# Patient Record
Sex: MALE | Race: WHITE | URBAN - METROPOLITAN AREA
[De-identification: names, ages, dates, MRNs, and addresses within clinical notes are randomized per-mention and may not be internally consistent; named-entity substitution may affect disease eponyms.]

---

## 2021-07-29 ENCOUNTER — HISTORICAL (OUTPATIENT)
Dept: ADMINISTRATIVE | Facility: HOSPITAL | Age: 56
End: 2021-07-29

## 2021-07-29 LAB
ALBUMIN SERPL-MCNC: 4 GM/DL (ref 3.5–5)
ALBUMIN/GLOB SERPL: 1.2 RATIO (ref 1.1–2)
ALP SERPL-CCNC: 55 UNIT/L (ref 40–150)
ALT SERPL-CCNC: 12 UNIT/L (ref 0–55)
AST SERPL-CCNC: 13 UNIT/L (ref 5–34)
BILIRUB SERPL-MCNC: 1.3 MG/DL
BILIRUBIN DIRECT+TOT PNL SERPL-MCNC: 0.4 MG/DL (ref 0–0.5)
BILIRUBIN DIRECT+TOT PNL SERPL-MCNC: 0.9 MG/DL (ref 0–0.8)
BUN SERPL-MCNC: 13 MG/DL (ref 8.4–25.7)
CALCIUM SERPL-MCNC: 9.9 MG/DL (ref 8.4–10.2)
CHLORIDE SERPL-SCNC: 98 MMOL/L (ref 98–107)
CO2 SERPL-SCNC: 33 MMOL/L (ref 22–29)
CREAT SERPL-MCNC: 0.78 MG/DL (ref 0.73–1.18)
GLOBULIN SER-MCNC: 3.4 GM/DL (ref 2.4–3.5)
GLUCOSE SERPL-MCNC: 90 MG/DL (ref 74–100)
POTASSIUM SERPL-SCNC: 4.9 MMOL/L (ref 3.5–5.1)
PROT SERPL-MCNC: 7.4 GM/DL (ref 6.4–8.3)
SARS-COV-2 AG RESP QL IA.RAPID: NEGATIVE
SODIUM SERPL-SCNC: 136 MMOL/L (ref 136–145)

## 2021-07-30 ENCOUNTER — HISTORICAL (OUTPATIENT)
Dept: SURGERY | Facility: HOSPITAL | Age: 56
End: 2021-07-30

## 2021-08-06 ENCOUNTER — HISTORICAL (OUTPATIENT)
Dept: ADMINISTRATIVE | Facility: HOSPITAL | Age: 56
End: 2021-08-06

## 2021-08-06 LAB — SARS-COV-2 AG RESP QL IA.RAPID: NEGATIVE

## 2021-08-09 ENCOUNTER — HISTORICAL (OUTPATIENT)
Dept: SURGERY | Facility: HOSPITAL | Age: 56
End: 2021-08-09

## 2022-04-11 ENCOUNTER — HISTORICAL (OUTPATIENT)
Dept: ADMINISTRATIVE | Facility: HOSPITAL | Age: 57
End: 2022-04-11

## 2022-04-29 VITALS
DIASTOLIC BLOOD PRESSURE: 69 MMHG | BODY MASS INDEX: 21.18 KG/M2 | SYSTOLIC BLOOD PRESSURE: 106 MMHG | HEIGHT: 68 IN | WEIGHT: 139.75 LBS

## 2022-04-30 NOTE — OP NOTE
Patient:   Valdez Beauchamp             MRN: [id number]            FIN: [id number]               Age:   56 years     Sex:  Male     :  1965   Associated Diagnoses:   None   Author:   Yasmine Lennon MD      Indication for Surgery   Date:    Preoperative Diagnosis:     Postoperative Diagnosis: Same    Operation performed: Primary Closure of left cheek skin excision    Attending Surgeon: Osmel Salazar MD    Resident Surgeon: Juan Lennon MD    Anesthesia: General    Findings: 4.5 x 5cm defect left cheek with granulation be    Indications: Open left cheek wound    Operative Report: The patient was placed into a supine position on the operating table  for patient comfort. The bolster was removed from left cheek. Good granulation bed  present. Measured 4.5x5cm. Due to surrounding laxity, decision was made to proceed  with primary closure.  The #15 blade was used to freshen skin edges and wound bed. Using double prong  skin hook and mets the wound was undermined in the subcutaneous tissue  circumferentially until wound edges met with minimal tension. Standing cone  deformities were excised with 15 blade and curved iris scissors.  Deep layer was reapproximated with vicryl suture. Skin was reapproximated  with 5-0 running nylon. Right neck sutures were removed. Both sites  cleaned and dressed with bactroban  sterile 2x2 dressing in preparation  for margin evaluation and staged reconstruction.  The patient was delivered to the recovery room in good, stable condition. The patient  tolerated the procedure well Estimated Blood Loss <5cc Complications none

## 2022-05-05 NOTE — HISTORICAL [facility]ER
This is a historical note converted from [place]. Formatting and pictures may have been removed.  Please reference [place] for original formatting and attached multimedia. Indication for Surgery  ?  Date: 7/30/21  ?  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  Postoperative Diagnosis: Same  ?  Operation performed: Excision of SCCa of R neck with elipse?primary closure, excision BCC L cheek with bolster placement  ?  Attending Surgeon:?Leo Louis MD  ?  Resident Surgeon:?MD Adrian Stone MD  ?  Anesthesia: General  ?  Findings: ?SCCa R neck,?6mm margins negative. L BCCa cheek with?4mm margins, permament?sent  ?  Specimen:?R neck lesion, L?cheek lesion with deep margin  ?  Indications:?57yo M with multiple facial skin lesion.?Seen by derm and found to have SCCa?right?neck,?BCCa L preauricular  ?  Operative Report: The patient was placed into a supine position on the operating table  for patient comfort. The right?neck and left?cheek?was examined. It demonstrated a ulerative raised R neck lesion and  polypoid bleeding L cheek lesion. 6mm margins were marked circumferentially around  the neck?lesion into normal appearing. 4mm were marked circumferentially around the cheek lesion into normal tissue.  ?The entire areas were then infiltrated with 17cc of 1%  lidocaine with 1:100,000 epinephrine. The head and neck was then prepped and draped in  standard fashion.  The #15 blade was used to make an incision in the skin along the marked lines of the right neck lesion. The  depth of the excision was through the subcutaneous tissue down to the level of the  subcutaneous and slightly deep down to SCM. The lesion was removed in its entirety from inferior to superior.  Careful attention was made for a homogenous tissue removal to the level of the  subcutaneous fat across the entire specimen, however the depth was carried down to SCM. Prior to removal, a short stitch was  placed at the superior  margin and a long stitch was placed along the lateral margin. The  specimen was then placed in formalin. The wound bed was then inspected and the  bipolar cautery was used for hemostasis. Next attention was turned to the left cheek lesion.  ?  The #15 blade was used to make an incision in the skin along the marked lines. The  depth of the excision was through the subcutaneous tissue down to the level of the  subcutaneous. The lesion was removed in its entirety from inferior to superior.  Careful attention was made for a homogenous tissue removal to the level of the  subcutaneous fat across the entire specimen, however the depth was carried down to SCM. Prior to removal, a short stitch was  placed at the superior margin and a long stitch was placed along the lateral margin. The  specimen was then placed in formalin. The wound bed was then inspected and the  bipolar cautery was used for hemostasis. Defect measured 4.5 x 5cm. Xeroform bolster was sutured into place with interrupted nylons.  ?   Once frozen pathology confirmed R neck lesion margins were negative, an ellipse incision was  made surrounding excision site. Using mota scissors and double prong skin hook, the skin was undermined in a safe fashion.  Using 3-0 Vicryl the subcutaneous layer?was reapproximated. The skin was reapproximated using 5-0 running prolene stitch. Bacitracin applied.  ?  The patient was delivered to the recovery room in good, stable condition. The patient  tolerated the procedure well and was comfortable throughout.